# Patient Record
Sex: MALE | Race: WHITE
[De-identification: names, ages, dates, MRNs, and addresses within clinical notes are randomized per-mention and may not be internally consistent; named-entity substitution may affect disease eponyms.]

---

## 2020-01-12 ENCOUNTER — HOSPITAL ENCOUNTER (EMERGENCY)
Dept: HOSPITAL 75 - ER | Age: 70
Discharge: HOME | End: 2020-01-12
Payer: MEDICARE

## 2020-01-12 VITALS — BODY MASS INDEX: 29.86 KG/M2 | WEIGHT: 220.46 LBS | HEIGHT: 71.97 IN

## 2020-01-12 VITALS — DIASTOLIC BLOOD PRESSURE: 94 MMHG | SYSTOLIC BLOOD PRESSURE: 131 MMHG

## 2020-01-12 DIAGNOSIS — Z87.442: ICD-10-CM

## 2020-01-12 DIAGNOSIS — K21.9: ICD-10-CM

## 2020-01-12 DIAGNOSIS — I10: ICD-10-CM

## 2020-01-12 DIAGNOSIS — Z87.891: ICD-10-CM

## 2020-01-12 DIAGNOSIS — E78.00: ICD-10-CM

## 2020-01-12 DIAGNOSIS — R42: Primary | ICD-10-CM

## 2020-01-12 LAB
ALBUMIN SERPL-MCNC: 4.3 GM/DL (ref 3.2–4.5)
ALP SERPL-CCNC: 49 U/L (ref 40–136)
ALT SERPL-CCNC: 26 U/L (ref 0–55)
APTT BLD: 29 SEC (ref 24–35)
BASOPHILS # BLD AUTO: 0 10^3/UL (ref 0–0.1)
BASOPHILS NFR BLD AUTO: 0 % (ref 0–10)
BILIRUB SERPL-MCNC: 0.7 MG/DL (ref 0.1–1)
BUN/CREAT SERPL: 21
CALCIUM SERPL-MCNC: 10.2 MG/DL (ref 8.5–10.1)
CHLORIDE SERPL-SCNC: 108 MMOL/L (ref 98–107)
CO2 SERPL-SCNC: 22 MMOL/L (ref 21–32)
CREAT SERPL-MCNC: 0.92 MG/DL (ref 0.6–1.3)
EOSINOPHIL # BLD AUTO: 0.1 10^3/UL (ref 0–0.3)
EOSINOPHIL NFR BLD AUTO: 2 % (ref 0–10)
ERYTHROCYTE [DISTWIDTH] IN BLOOD BY AUTOMATED COUNT: 13.1 % (ref 10–14.5)
GFR SERPLBLD BASED ON 1.73 SQ M-ARVRAT: > 60 ML/MIN
GLUCOSE SERPL-MCNC: 128 MG/DL (ref 70–105)
HCT VFR BLD CALC: 46 % (ref 40–54)
HGB BLD-MCNC: 15.9 G/DL (ref 13.3–17.7)
INR PPP: 1 (ref 0.8–1.4)
LYMPHOCYTES # BLD AUTO: 1.3 X 10^3 (ref 1–4)
LYMPHOCYTES NFR BLD AUTO: 19 % (ref 12–44)
MAGNESIUM SERPL-MCNC: 2 MG/DL (ref 1.6–2.4)
MANUAL DIFFERENTIAL PERFORMED BLD QL: NO
MCH RBC QN AUTO: 32 PG (ref 25–34)
MCHC RBC AUTO-ENTMCNC: 34 G/DL (ref 32–36)
MCV RBC AUTO: 94 FL (ref 80–99)
MONOCYTES # BLD AUTO: 0.5 X 10^3 (ref 0–1)
MONOCYTES NFR BLD AUTO: 8 % (ref 0–12)
NEUTROPHILS # BLD AUTO: 4.8 X 10^3 (ref 1.8–7.8)
NEUTROPHILS NFR BLD AUTO: 71 % (ref 42–75)
PLATELET # BLD: 191 10^3/UL (ref 130–400)
PMV BLD AUTO: 10.2 FL (ref 7.4–10.4)
POTASSIUM SERPL-SCNC: 3.9 MMOL/L (ref 3.6–5)
PROT SERPL-MCNC: 6.9 GM/DL (ref 6.4–8.2)
PROTHROMBIN TIME: 13.8 SEC (ref 12.2–14.7)
SODIUM SERPL-SCNC: 140 MMOL/L (ref 135–145)
TSH SERPL DL<=0.05 MIU/L-ACNC: 1.61 UIU/ML (ref 0.35–4.94)
WBC # BLD AUTO: 6.8 10^3/UL (ref 4.3–11)

## 2020-01-12 PROCEDURE — 80053 COMPREHEN METABOLIC PANEL: CPT

## 2020-01-12 PROCEDURE — 85025 COMPLETE CBC W/AUTO DIFF WBC: CPT

## 2020-01-12 PROCEDURE — 70450 CT HEAD/BRAIN W/O DYE: CPT

## 2020-01-12 PROCEDURE — 71045 X-RAY EXAM CHEST 1 VIEW: CPT

## 2020-01-12 PROCEDURE — 85730 THROMBOPLASTIN TIME PARTIAL: CPT

## 2020-01-12 PROCEDURE — 85610 PROTHROMBIN TIME: CPT

## 2020-01-12 PROCEDURE — 84443 ASSAY THYROID STIM HORMONE: CPT

## 2020-01-12 PROCEDURE — 36415 COLL VENOUS BLD VENIPUNCTURE: CPT

## 2020-01-12 PROCEDURE — 83735 ASSAY OF MAGNESIUM: CPT

## 2020-01-12 PROCEDURE — 93005 ELECTROCARDIOGRAM TRACING: CPT

## 2020-01-12 NOTE — DIAGNOSTIC IMAGING REPORT
EXAMINATION: Chest radiograph, portable AP view.



DATE: 1/12/2020 11:06 AM hours.



INDICATION: 69-year-old male, dizziness.



COMPARISON:  CT chest July 1, 2014.



FINDINGS: Heart size and mediastinal contours are unremarkable.

There is no identified pneumothorax. There is no large pleural

effusion. There is no identified focal airspace consolidation.



IMPRESSION: No identified acute cardiopulmonary abnormality.



Dictated by: 



  Dictated on workstation # [id number]

## 2020-01-12 NOTE — DIAGNOSTIC IMAGING REPORT
PROCEDURE: CT head wo r/o stroke.



TECHNIQUE: Multiple contiguous axial images were obtained through

the brain without the use of intravenous contrast. Auto Exposure

Controls were utilized during the CT exam to meet ALARA standards

for radiation dose reduction. 



INDICATION: Dizziness. 



COMPARISON: None.



FINDINGS: No intracranial hemorrhage, mass effect, hydrocephalus

or extra-axial fluid collections. No CT evidence for territorial

infarction. Osseous structures are intact. The visualized

paranasal sinuses and mastoids are clear.



IMPRESSION: No acute intracranial CT findings.



Dictated by: 



  Dictated on workstation # [id number]

## 2020-06-02 ENCOUNTER — HOSPITAL ENCOUNTER (OUTPATIENT)
Dept: HOSPITAL 75 - RAD FS | Age: 70
End: 2020-06-02
Attending: NURSE PRACTITIONER
Payer: MEDICARE

## 2020-06-02 DIAGNOSIS — M19.011: Primary | ICD-10-CM

## 2020-06-02 PROCEDURE — 73030 X-RAY EXAM OF SHOULDER: CPT

## 2020-06-02 NOTE — DIAGNOSTIC IMAGING REPORT
Right shoulder at 8:38.



Indication:  Shoulder pain



3 views were obtained. There are no prior right shoulder

examinations available for comparison.



There is no fracture, dislocation or acute bony abnormality

evident. There is mild degenerative disease of the glenohumeral

joint and the acromioclavicular joint. The degenerative changes

seem similar to the prior chest exam of 01/12/2020. The soft

tissues are unremarkable.



Impression:

1. There is no evidence for an acute bony abnormality.

2. There is mild degenerative disease involving the shoulder

joint. However if there is clinical concern regarding injury to

the rotator cuff or labrum, then MRI would be recommended for

further study.



Dictated by: 



  Dictated on workstation # [id number]

## 2020-06-08 ENCOUNTER — HOSPITAL ENCOUNTER (OUTPATIENT)
Dept: HOSPITAL 75 - RAD | Age: 70
End: 2020-06-08
Attending: NURSE PRACTITIONER
Payer: MEDICARE

## 2020-06-08 DIAGNOSIS — M19.011: ICD-10-CM

## 2020-06-08 DIAGNOSIS — M75.121: Primary | ICD-10-CM

## 2020-06-08 PROCEDURE — 73221 MRI JOINT UPR EXTREM W/O DYE: CPT

## 2020-06-08 NOTE — DIAGNOSTIC IMAGING REPORT
PROCEDURE: MRI right joint upper extremity without contrast.



TECHNIQUE: Multiplanar, multisequence non contrast-enhanced MRI

of the right upper extremity was accomplished.



INDICATION:  Pain in the right shoulder. Injury seven months ago.



COMPARISON: Radiograph from 06/02/2020.



FINDINGS:



No acute fracture or dislocation is seen in the right shoulder.

Prominent cystlike changes are seen in the lateral humeral head,

likely degenerative. No joint effusion is seen. There is an os

acromiale. Marked degenerative changes are seen in the

acromioclavicular joint. No joint effusion is seen.



The supraspinatus and infraspinatus tendons demonstrate moderate

tendinosis with no high-grade partial-thickness or full-thickness

tears. Low-grade partial-thickness intrasubstance tearing is seen

in the infraspinatus and subscapularis tendons. The teres minor

tendon appears intact.



The long head of the biceps tendon is not well seen in the

bicipital groove, and appears to be completely torn.



The glenoid labrum is suboptimally evaluated without

intra-articular contrast, however there does appear to be

degeneration of the labrum and irregular tearing with no para

labral cysts seen.



The acromion has a curved undersurface without significant

hooking. The coracoclavicular and coracoacromial ligaments are

intact. Soft tissues about the right shoulder are otherwise

unremarkable.



IMPRESSION:

1. Moderate tendinosis and low-grade partial-thickness tearing in

the right rotator cuff, with no high-grade partial-thickness or

full-thickness tear seen.

2. The long head of the biceps tendon appears completely torn.

3. Os acromiale with marked degenerative change in the

acromioclavicular joint.



Dictated by: 



  Dictated on workstation # [id number]

## 2020-10-22 ENCOUNTER — HOSPITAL ENCOUNTER (OUTPATIENT)
Dept: HOSPITAL 75 - RAD FS | Age: 70
End: 2020-10-22
Attending: NURSE PRACTITIONER
Payer: MEDICARE

## 2020-10-22 DIAGNOSIS — M77.32: Primary | ICD-10-CM

## 2020-10-22 PROCEDURE — 73630 X-RAY EXAM OF FOOT: CPT

## 2020-10-22 NOTE — DIAGNOSTIC IMAGING REPORT
INDICATION: Left foot pain



AP, oblique, and lateral views of the left foot are obtained.



There is plantar calcaneal spurring. There is no acute fracture

or acute bony abnormality. Joint spaces appear grossly

unremarkable.



IMPRESSION:



No acute fracture or acute bony abnormality. There is some

plantar calcaneal spurring.



Dictated by: 



  Dictated on workstation # [id number]

## 2021-05-24 NOTE — ED GENERAL
General


Chief Complaint:  Dizziness/Syncope


Stated Complaint:  DIZZINESS/NAUSEA


Nursing Triage Note:  


States that this morning while in bed he turned over and became very dizzy.  


States fif he turns his head a certain way he becomes dizzy and nauseated.  


States a few days ago he had an ear ache and has nightly sinus congestion.  No 


fevers, vomiting, diarrhea


Nursing Sepsis Screen:  No Definite Risk


Source of Information:  Patient


Exam Limitations:  No Limitations





History of Present Illness


Date Seen by Provider:  Jan 12, 2020


Time Seen by Provider:  10:25


Initial Comments


PT ARRIVES VIA POV FROM HOME--HAD SOMEONE DRIVE HIM HERE. 


STATES HE ROLLED OVER IN BED WHEN HE WOKE UP, AND EVERYTHING STARTED SPINNING


STATES WHEN HE LAYS STILL, HE IS NOT DIZZY, BUT IF HE TURNS HIS HEAD EITHER 

DIRECTION OR MOVES TOO FAST, EVERYTHING STARTS SPINNING


HAS HAD MILD NAUSEA WITH IT. IS NOT NAUSEATED NOW


NO VOMITING OR DIARRHEA


NO HEADACHE


NO VISION CHANGES


NO CHEST PAIN


NO SHORTNESS OF BREATH


NO PALPITATIONS


NO PARESTHESIAS OR MOTOR DEFICITS





HAD RIGHT EAR PAIN A COUPLE OF DAYS AGO, BUT NONE SINCE


HAS CHRONIC NIGHTLY SINUS CONGESTION


NO FEVER





HAD SAME THING MANY YEARS AGO, BUT NO PROBLEMS SINCE THEN





PCP: SANJU GUARDADO





Allergies and Home Medications


Allergies


Coded Allergies:  


     No Known Drug Allergies (Unverified , 6/24/14)





Home Medications


Atenolol 50 Mg Tablet, 1 EACH PO DAILY, (Reported)


Ciprofloxacin HCl 250 Mg Tablet, 250 MG PO BID


   TAKE ONE TAB BY MOUTH TWICE A DAY. USE ALL OF THIS MED AS PRESCRIBED. START 

THIS MEDICATION TOMORROW. 


   Prescribed by: DEEPAK ALVAREZ on 7/8/14 0931


Hydromorphone Hcl 4 Mg Tab, 1-2 TAB PO q4-6hrs PRN for PAIN


   MAY TAKE 1 OR 2 TABS BY MOUTH EVERY 4 TO 6 HRS AS NEEDED FOR PAIN. 


   Prescribed by: DEEPAK ALVAREZ on 7/8/14 0945


Meclizine HCl 25 Mg Tablet, 25-50 MG PO Q6H


   Prescribed by: LEONILA IBARRA on 1/12/20 1123


Ondansetron 4 Mg Tab.rapdis, 4 MG PO Q4H


   Prescribed by: LEONILA IBARRA on 1/12/20 1123


Ondansetron Hcl 4 Mg Tab, 1-2 TAB PO q4-6hrs PRN


   MAY TAKE 1 OR 2 TABS BY MOUTH EVERY 4 TO 6 HRS AS NEEDED FOR NAUSEA/VOMITING 


   Prescribed by: DEEPAK ALVAREZ on 7/8/14 0945


Rosuvastatin Calcium 5 Mg Tablet, 1 EACH PO DAILY, (Reported)


Scopolamine 1 Each Patch.td72, 1 EACH TD Q72 HOURS


   Prescribed by: LEONILA IBARRA on 1/12/20 1123


Triamterene/Hydrochlorothiazid 1 Each Tablet, 0.5 EACH PO DAILY, (Reported)





Patient Home Medication List


Home Medication List Reviewed:  Yes





Review of Systems


Review of Systems


Constitutional:  see HPI; No chills, No diaphoresis; dizziness; No fever, No 

malaise, No weakness


EENTM:  see HPI, ear pain, nose congestion


Respiratory:  no symptoms reported


Cardiovascular:  no symptoms reported


Gastrointestinal:  see HPI; No abdominal pain, No diarrhea; nausea; No vomiting


Genitourinary:  no symptoms reported


Musculoskeletal:  other (HAS CHRONIC GENERALIZED PAIN, ESPECIALLY NECK, BACK, 

SHOULDERS, FEET AND ANKLES)


Skin:  no symptoms reported


Psychiatric/Neurological:  See HPI (DIZZINESS); Denies Headache, Denies 

Numbness, Denies Paresthesia, Denies Seizure, Denies Tingling, Denies Weakness





Past Medical-Social-Family Hx


Patient Social History


Alcohol Use:  Denies Use


Recreational Drug Use:  No


Smoking Status:  Former Smoker


Former Smoker, Quit:  Jan 1, 1960


2nd Hand Smoke Exposure:  No


Recent Foreign Travel:  No


Contact w/Someone Who Travel:  No


Recent Infectious Disease Expo:  No


Physical Abuse:  No


Sexual Abuse:  No


Mistreated:  No


Fear:  No





Past Medical History


Surgeries:  Yes (RENAL STONES-LITHOTRIPSY;CYST ON BACK;COLONOSCOPY;LEFT 

URETEROSCOPY;TEETH)


Renal


Respiratory:  No


Cardiac:  Yes


High Cholesterol, Hypertension


Neurological:  No


Genitourinary:  Yes


Kidney Stones


Gastrointestinal:  Yes


Gastroesophageal Reflux, Hiatal Hernia


Musculoskeletal:  Yes


Arthritis


Endocrine:  No


HEENT:  No


Cancer:  No


Psychosocial:  No


Integumentary:  No


Blood Disorders:  No


Adverse Reaction/Blood Tranf:  No





Physical Exam


Vital Signs





Vital Signs - First Documented








 1/12/20





 10:11


 


Temp 36.4


 


Pulse 56


 


Resp 16


 


B/P (MAP) 136/96 (109)


 


Pulse Ox 96





Capillary Refill : Less Than 3 Seconds


Height, Weight, BMI


Height: 6'0.00"


Weight: 217lbs. oz. 98.497690lb; 29.00 BMI


Method:Stated


General Appearance:  No Apparent Distress, WD/WN


HEENT:  PERRL/EOMI, TMs Normal, Normal ENT Inspection, Pharynx Normal, Moist 

Mucous Membranes, Other (EDENTULOUS. NO SINUS TENDERNES. HAS MILD POST NASAL 

DRAINAGE. )


Neck:  Full Range of Motion, Normal Inspection, Non Tender, Supple; No Carotid 

Bruit, No JVD


Respiratory:  Normal Breath Sounds, No Accessory Muscle Use, No Respiratory 

Distress


Cardiovascular:  Regular Rate, Rhythm, No Edema, No Gallop, No JVD, No Murmur, 

Normal Peripheral Pulses


Gastrointestinal:  Normal Bowel Sounds, No Organomegaly, No Pulsatile Mass, Non 

Tender, Soft


Back:  No CVA Tenderness, No Vertebral Tenderness


Extremity:  Normal Capillary Refill, Normal Inspection, Normal Range of Motion, 

Non Tender, No Calf Tenderness, No Pedal Edema


Neurologic/Psychiatric:  Alert, Oriented x3, No Motor/Sensory Deficits, Normal 

Mood/Affect, CNs II-XII Norm as Tested, Abnormal Cerebellar Tests (NORMAL 

FINGER-TO-NOSE, BUT UNABLE TO DO ONE-LEG STANDING. )


Skin:  Normal Color, Warm/Dry





Progress/Results/Core Measures


Suspected Sepsis


Recent Fever Within 48 Hours:  No


Infection Criteria Present:  None


New/Unexplained  Altered Menta:  No


Sepsis Screen:  No Definite Risk


SIRS


Temperature: 


Pulse: 56 


Respiratory Rate: 16


 


Laboratory Tests


1/12/20 10:36: White Blood Count 6.8


Blood Pressure 136 /96 


Mean: 109


 


Laboratory Tests


1/12/20 10:36: 


Creatinine 0.92, INR Comment 1.0, Platelet Count 191, Total Bilirubin 0.7








Results/Orders


Lab Results





Laboratory Tests








Test


 1/12/20


10:36 Range/Units


 


 


White Blood Count


 6.8 


 4.3-11.0


10^3/uL


 


Red Blood Count


 4.95 


 4.35-5.85


10^6/uL


 


Hemoglobin 15.9  13.3-17.7  G/DL


 


Hematocrit 46  40-54  %


 


Mean Corpuscular Volume 94  80-99  FL


 


Mean Corpuscular Hemoglobin 32  25-34  PG


 


Mean Corpuscular Hemoglobin


Concent 34 


 32-36  G/DL





 


Red Cell Distribution Width 13.1  10.0-14.5  %


 


Platelet Count


 191 


 130-400


10^3/uL


 


Mean Platelet Volume 10.2  7.4-10.4  FL


 


Neutrophils (%) (Auto) 71  42-75  %


 


Lymphocytes (%) (Auto) 19  12-44  %


 


Monocytes (%) (Auto) 8  0-12  %


 


Eosinophils (%) (Auto) 2  0-10  %


 


Basophils (%) (Auto) 0  0-10  %


 


Neutrophils # (Auto) 4.8  1.8-7.8  X 10^3


 


Lymphocytes # (Auto) 1.3  1.0-4.0  X 10^3


 


Monocytes # (Auto) 0.5  0.0-1.0  X 10^3


 


Eosinophils # (Auto)


 0.1 


 0.0-0.3


10^3/uL


 


Basophils # (Auto)


 0.0 


 0.0-0.1


10^3/uL


 


Prothrombin Time 13.8  12.2-14.7  SEC


 


INR Comment 1.0  0.8-1.4  


 


Activated Partial


Thromboplast Time 29 


 24-35  SEC





 


Sodium Level 140  135-145  MMOL/L


 


Potassium Level 3.9  3.6-5.0  MMOL/L


 


Chloride Level 108 H   MMOL/L


 


Carbon Dioxide Level 22  21-32  MMOL/L


 


Anion Gap 10  5-14  MMOL/L


 


Blood Urea Nitrogen 19 H 7-18  MG/DL


 


Creatinine


 0.92 


 0.60-1.30


MG/DL


 


Estimat Glomerular Filtration


Rate > 60 


  





 


BUN/Creatinine Ratio 21   


 


Glucose Level 128 H   MG/DL


 


Calcium Level 10.2 H 8.5-10.1  MG/DL


 


Corrected Calcium 10.0  8.5-10.1  MG/DL


 


Magnesium Level 2.0  1.6-2.4  MG/DL


 


Total Bilirubin 0.7  0.1-1.0  MG/DL


 


Aspartate Amino Transf


(AST/SGOT) 18 


 5-34  U/L





 


Alanine Aminotransferase


(ALT/SGPT) 26 


 0-55  U/L





 


Alkaline Phosphatase 49    U/L


 


Total Protein 6.9  6.4-8.2  GM/DL


 


Albumin 4.3  3.2-4.5  GM/DL


 


TSH Cascade Testing


 1.61 


 0.35-4.94


UIU/ML








My Orders





Orders - LEONILA IBARRA DO


Scopolamine Patch (Transderm-Scop Patch) (1/12/20 10:45)


Meclizine Tablet (Antivert Tablet) (1/12/20 10:45)


Ekg Tracing (1/12/20 10:33)


Monitor-Rhythm Ecg Trace Only (1/12/20 10:33)


Ct Head Wo-R/O Stroke (1/12/20 10:33)


Chest 1 View, Ap/Pa Only (1/12/20 10:33)


Cbc With Automated Diff (1/12/20 10:33)


Comprehensive Metabolic Panel (1/12/20 10:33)


Magnesium (1/12/20 10:33)


Protime With Inr (1/12/20 10:33)


Partial Thromboplastin Time (1/12/20 10:33)


Thyroid Analyzer (1/12/20 10:33)





Medications Given in ED





Current Medications








 Medications  Dose


 Ordered  Sig/Elizabeth


 Route  Start Time


 Stop Time Status Last Admin


Dose Admin


 


 Meclizine HCl  50 mg  ONCE  ONCE


 PO  1/12/20 10:45


 1/12/20 10:46 DC 1/12/20 10:45


50 MG


 


 Scopolamine  1.5 mg  ONCE  ONCE


 TD  1/12/20 10:45


 1/12/20 10:46 DC 1/12/20 10:45


1.5 MG








Vital Signs/I&O











 1/12/20





 10:11


 


Temp 36.4


 


Pulse 56


 


Resp 16


 


B/P (MAP) 136/96 (109)


 


Pulse Ox 96





Capillary Refill : Less Than 3 Seconds








Blood Pressure Mean:                    109











ECG


Initial ECG Impression Date:  Jan 12, 2020


Initial ECG Impression Time:  10:39


Initial ECG Rate:  51


Initial ECG Rhythm:  Normal Sinus


Initial ECG Impression:  Normal





Diagnostic Imaging





Comments


CT HEAD--NO ACUTE PROCESS, PER RADIOLOGIST REPORT AT 1122





CXR--NO ACUTE PROCESS, PER RADIOLOGIST REPORT AT 1147


   Reviewed:  Reviewed by Me





Departure


Impression





   Primary Impression:  


   Vertigo


Disposition:  01 HOME, SELF-CARE


Condition:  Stable





Departure-Patient Inst.


Referrals:  


LINDA CHRISTIAN MD (PCP/Family)


Primary Care Physician


Patient Instructions:  Vertigo (a Type of Dizziness) (DC)





Add. Discharge Instructions:  


SLOW POSITION CHANGES


NO DRIVING IF YOU ARE DIZZY





FOLLOW UP WITH DR. CHRISTIAN THIS WEEK FOR FURTHER CARE, RETURN TO ER IF WORSE





All discharge instructions reviewed with patient and/or family. Voiced 

understanding.


Scripts


Meclizine HCl (Meclizine HCl) 25 Mg Tablet


25-50 MG PO Q6H for Dizziness, #30 TAB


   Prov: LEONILA IBARRA DO         1/12/20 


Ondansetron (Ondansetron Odt) 4 Mg Tab.rapdis


4 MG PO Q4H for Nausea/Vomiting, #10 TAB


   Prov: LEONILA IBARRA DO         1/12/20 


Scopolamine (Transderm-Scop) 1 Each Patch.td72


1 EACH TD Q72 HOURS for Dizziness, #3 PATCH


   Prov: LEONILA IBARRA DO         1/12/20











LEONILA IBARRA DO                 Jan 12, 2020 11:00 prostate Ca with mets to bone/difficulty chewing/difficulty feeding self/difficulty swallowing/persistent lack of appetite/other (specify)

## 2021-12-02 ENCOUNTER — HOSPITAL ENCOUNTER (OUTPATIENT)
Dept: HOSPITAL 75 - RAD FS | Age: 71
End: 2021-12-02
Attending: NURSE PRACTITIONER
Payer: MEDICARE

## 2021-12-02 DIAGNOSIS — M25.572: Primary | ICD-10-CM

## 2021-12-02 PROCEDURE — 73610 X-RAY EXAM OF ANKLE: CPT

## 2021-12-02 NOTE — DIAGNOSTIC IMAGING REPORT
INDICATION: Chronic left ankle pain.



FINDINGS: 3 views. No fractures or dislocations. The articulating

surfaces are smooth. Joint spaces are well-maintained. There is

minimal lucency along the medial corner of the tibial dome

articulating surface. No loose bodies.



IMPRESSION: Subcortical lucency along the medial corner of the

articulating surface of the talus suggesting mild osteochondral

defect.



Dictated by: 



  Dictated on workstation # [id number]

## 2022-01-13 ENCOUNTER — HOSPITAL ENCOUNTER (OUTPATIENT)
Dept: HOSPITAL 75 - CARD | Age: 72
End: 2022-01-13
Attending: INTERNAL MEDICINE
Payer: MEDICARE

## 2022-01-13 DIAGNOSIS — I11.9: Primary | ICD-10-CM

## 2022-01-13 PROCEDURE — 93306 TTE W/DOPPLER COMPLETE: CPT

## 2023-04-27 ENCOUNTER — HOSPITAL ENCOUNTER (OUTPATIENT)
Dept: HOSPITAL 75 - RAD FS | Age: 73
End: 2023-04-27
Attending: OTOLARYNGOLOGY
Payer: MEDICARE

## 2023-04-27 DIAGNOSIS — J34.2: ICD-10-CM

## 2023-04-27 DIAGNOSIS — J32.9: Primary | ICD-10-CM

## 2023-04-27 PROCEDURE — 70486 CT MAXILLOFACIAL W/O DYE: CPT

## 2023-04-27 NOTE — DIAGNOSTIC IMAGING REPORT
PROCEDURE: CT sinuses without contrast



TECHNIQUE: Multiple contiguous axial images were obtained through

the sinuses without the use of intravenous contrast. Coronal and

sagittal reformations were then performed. Auto Exposure Controls

were utilized during the CT exam to meet ALARA standards for

radiation dose reduction. 



INDICATION: Chronic sinusitis



There is leftward deviation and spurring of the nasal septum.

Paranasal sinuses are clear, bilaterally. There is no evidence of

air-fluid level or significant mural thickening. Mastoid air

cells and middle ear cavities are also clear. There is mild

atherosclerotic calcification within distal internal carotid

arteries. There is punctate calcification along the posterior

inferior aspect of adenoidal tissue in the midline. Moderate

degenerative findings are seen at the C1-C2 level.



IMPRESSION: There is leftward deviation of nasal septum with

patency of osteomeatal complexes. No acute sinusitis or

significant inflammation is seen.



There is focal probable dystrophic calcification or other

radiopaque substance in the adenoidal tissue. Clinical

correlation would be useful.



Dictated by: 



  Dictated on workstation # [id number]

## 2023-06-22 ENCOUNTER — HOSPITAL ENCOUNTER (OUTPATIENT)
Dept: HOSPITAL 75 - PREOP | Age: 73
Discharge: HOME | End: 2023-06-22
Attending: OTOLARYNGOLOGY
Payer: MEDICARE

## 2023-06-22 VITALS — WEIGHT: 218.26 LBS | BODY MASS INDEX: 29.56 KG/M2 | HEIGHT: 72.05 IN

## 2023-06-22 DIAGNOSIS — Z01.818: Primary | ICD-10-CM

## 2023-06-30 ENCOUNTER — HOSPITAL ENCOUNTER (OUTPATIENT)
Dept: HOSPITAL 75 - SDC | Age: 73
Discharge: HOME | End: 2023-06-30
Attending: OTOLARYNGOLOGY
Payer: MEDICARE

## 2023-06-30 VITALS — SYSTOLIC BLOOD PRESSURE: 119 MMHG | DIASTOLIC BLOOD PRESSURE: 70 MMHG

## 2023-06-30 VITALS — DIASTOLIC BLOOD PRESSURE: 86 MMHG | SYSTOLIC BLOOD PRESSURE: 142 MMHG

## 2023-06-30 VITALS — SYSTOLIC BLOOD PRESSURE: 110 MMHG | DIASTOLIC BLOOD PRESSURE: 74 MMHG

## 2023-06-30 VITALS — SYSTOLIC BLOOD PRESSURE: 121 MMHG | DIASTOLIC BLOOD PRESSURE: 71 MMHG

## 2023-06-30 VITALS — DIASTOLIC BLOOD PRESSURE: 95 MMHG | SYSTOLIC BLOOD PRESSURE: 125 MMHG

## 2023-06-30 VITALS — HEIGHT: 72.05 IN | BODY MASS INDEX: 29.56 KG/M2 | WEIGHT: 218.26 LBS

## 2023-06-30 VITALS — SYSTOLIC BLOOD PRESSURE: 130 MMHG | DIASTOLIC BLOOD PRESSURE: 86 MMHG

## 2023-06-30 VITALS — DIASTOLIC BLOOD PRESSURE: 87 MMHG | SYSTOLIC BLOOD PRESSURE: 116 MMHG

## 2023-06-30 VITALS — DIASTOLIC BLOOD PRESSURE: 91 MMHG | SYSTOLIC BLOOD PRESSURE: 131 MMHG

## 2023-06-30 DIAGNOSIS — J39.2: ICD-10-CM

## 2023-06-30 DIAGNOSIS — J34.3: Primary | ICD-10-CM

## 2023-06-30 DIAGNOSIS — E66.9: ICD-10-CM

## 2023-06-30 LAB
BASOPHILS # BLD AUTO: 0.1 10^3/UL (ref 0–0.1)
BASOPHILS NFR BLD AUTO: 1 % (ref 0–10)
BUN/CREAT SERPL: 22
CALCIUM SERPL-MCNC: 10.1 MG/DL (ref 8.5–10.1)
CHLORIDE SERPL-SCNC: 109 MMOL/L (ref 98–107)
CO2 SERPL-SCNC: 22 MMOL/L (ref 21–32)
CREAT SERPL-MCNC: 0.94 MG/DL (ref 0.6–1.3)
EOSINOPHIL # BLD AUTO: 0.2 10^3/UL (ref 0–0.3)
EOSINOPHIL NFR BLD AUTO: 2 % (ref 0–10)
GFR SERPLBLD BASED ON 1.73 SQ M-ARVRAT: 85 ML/MIN
GLUCOSE SERPL-MCNC: 118 MG/DL (ref 70–105)
HCT VFR BLD CALC: 45 % (ref 40–54)
HGB BLD-MCNC: 15.9 G/DL (ref 13.3–17.7)
LYMPHOCYTES # BLD AUTO: 3.3 10^3/UL (ref 1–4)
LYMPHOCYTES NFR BLD AUTO: 42 % (ref 12–44)
MANUAL DIFFERENTIAL PERFORMED BLD QL: NO
MCH RBC QN AUTO: 32 PG (ref 25–34)
MCHC RBC AUTO-ENTMCNC: 35 G/DL (ref 32–36)
MCV RBC AUTO: 93 FL (ref 80–99)
MONOCYTES # BLD AUTO: 0.6 10^3/UL (ref 0–1)
MONOCYTES NFR BLD AUTO: 8 % (ref 0–12)
NEUTROPHILS # BLD AUTO: 3.6 10^3/UL (ref 1.8–7.8)
NEUTROPHILS NFR BLD AUTO: 46 % (ref 42–75)
PLATELET # BLD: 227 10^3/UL (ref 130–400)
PMV BLD AUTO: 9.6 FL (ref 9–12.2)
POTASSIUM SERPL-SCNC: 3.2 MMOL/L (ref 3.6–5)
SODIUM SERPL-SCNC: 141 MMOL/L (ref 135–145)
WBC # BLD AUTO: 7.7 10^3/UL (ref 4.3–11)

## 2023-06-30 PROCEDURE — 87081 CULTURE SCREEN ONLY: CPT

## 2023-06-30 PROCEDURE — 85025 COMPLETE CBC W/AUTO DIFF WBC: CPT

## 2023-06-30 PROCEDURE — 80048 BASIC METABOLIC PNL TOTAL CA: CPT

## 2023-06-30 PROCEDURE — 93005 ELECTROCARDIOGRAM TRACING: CPT

## 2023-06-30 PROCEDURE — 36415 COLL VENOUS BLD VENIPUNCTURE: CPT

## 2023-06-30 NOTE — ANESTHESIA-GENERAL POST-OP
General


Patient Condition


Mental Status/LOC:  Same as Preop


Cardiovascular:  Satisfactory


Nausea/Vomiting:  Absent


Respiratory:  Satisfactory


Pain:  Controlled


Complications:  Absent





Post Op Complications


Complications


None





Follow Up Care/Instructions


Patient Instructions


None needed.





Anesthesia/Patient Condition


Patient Condition


Patient is doing well, no complaints, stable vital signs, no apparent adverse 

anesthesia problems.   


No complications reported per nursing.











LISA HERRERA CRNA          Jun 30, 2023 08:21

## 2023-06-30 NOTE — PROGRESS NOTE-PRE OPERATIVE
Pre-Operative Progress Note


Date of Available H&P:  Jun 30, 2023


Date H&P Reviewed:  Jun 30, 2023


Time H&P Reviewed:  06:30


History & Physical:  H&P Reviewed, Patient Examed, No changes noted


Changes from last HP


none


Pre-Operative Diagnosis:  Bialt Hyper of INferior Turbinates, Nasopharyngeal 

Mass











MATT RUIZ MD             Jun 30, 2023 07:05

## 2023-06-30 NOTE — PROGRESS NOTE-POST OPERATIVE
Post-Operative Progess Note


Surgeon (s)/Assistant (s)


Surgeon


MATT RUIZ MD


Assistant


n/a





Pre-Operative Diagnosis


Bialt Hyper of INferior Turbinates, Nasopharyngeal Mass





Post-Operative Diagnosis


same





Post-Op Procedure Note


Date of Procedure:  Jun 30, 2023


Name of Procedure Performed:  


Bilat Partial Reduction of the INferior Turbinates, Nasopharyngeal Biopsy


Description & Findings


Description and Findings:





n/a


Anesthesia Type


GET


Estimated Blood Loss


minimal


Packing


none.


Specimen(s) collected/removed


nasopharyngeal biopsy to pathology











MATT RUIZ MD             Jun 30, 2023 07:06